# Patient Record
Sex: FEMALE | Race: BLACK OR AFRICAN AMERICAN | NOT HISPANIC OR LATINO | URBAN - METROPOLITAN AREA
[De-identification: names, ages, dates, MRNs, and addresses within clinical notes are randomized per-mention and may not be internally consistent; named-entity substitution may affect disease eponyms.]

---

## 2018-04-16 PROBLEM — 55570000 ASTHMA WITHOUT STATUS ASTHMATICUS: Status: ACTIVE | Noted: 2018-04-16

## 2018-04-16 PROBLEM — 59621000 ESSENTIAL HYPERTENSION: Status: ACTIVE | Noted: 2018-04-16

## 2018-04-16 PROBLEM — 429006005 FAMILY HISTORY OF MALIGNANT NEOPLASM OF GASTROINTESTINAL TRACT: Status: ACTIVE | Noted: 2018-04-16

## 2018-04-16 PROBLEM — 200938002 DISCOID LUPUS ERYTHEMATOSUS: Status: ACTIVE | Noted: 2018-04-16

## 2018-04-16 PROBLEM — 51615001 PULMONARY FIBROSIS: Status: ACTIVE | Noted: 2018-04-16

## 2018-04-16 PROBLEM — 54586004 LOWER ABDOMINAL PAIN: Status: ACTIVE | Noted: 2018-04-16

## 2018-05-30 PROBLEM — 238131007 OVERWEIGHT: Status: ACTIVE | Noted: 2018-05-30

## 2019-12-09 PROBLEM — 441988000 IMAGING OF ABDOMEN ABNORMAL: Status: ACTIVE | Noted: 2019-12-09

## 2021-06-24 ENCOUNTER — OFFICE VISIT (OUTPATIENT)
Dept: URBAN - METROPOLITAN AREA CLINIC 27 | Facility: CLINIC | Age: 66
End: 2021-06-24

## 2021-06-24 PROBLEM — 266435005 GASTRO-ESOPHAGEAL REFLUX DISEASE WITHOUT ESOPHAGITIS: Status: ACTIVE | Noted: 2021-06-24

## 2021-07-13 ENCOUNTER — OFFICE VISIT (OUTPATIENT)
Dept: URBAN - METROPOLITAN AREA SURGERY CENTER 7 | Facility: SURGERY CENTER | Age: 66
End: 2021-07-13

## 2021-07-28 ENCOUNTER — OFFICE VISIT (OUTPATIENT)
Dept: URBAN - METROPOLITAN AREA CLINIC 27 | Facility: CLINIC | Age: 66
End: 2021-07-28

## 2021-10-04 ENCOUNTER — OFFICE VISIT (OUTPATIENT)
Dept: URBAN - METROPOLITAN AREA MEDICAL CENTER 8 | Facility: MEDICAL CENTER | Age: 66
End: 2021-10-04

## 2021-10-20 ENCOUNTER — OFFICE VISIT (OUTPATIENT)
Dept: URBAN - METROPOLITAN AREA CLINIC 27 | Facility: CLINIC | Age: 66
End: 2021-10-20

## 2022-04-30 ENCOUNTER — TELEPHONE ENCOUNTER (OUTPATIENT)
Dept: URBAN - METROPOLITAN AREA CLINIC 121 | Facility: CLINIC | Age: 67
End: 2022-04-30

## 2022-05-01 ENCOUNTER — TELEPHONE ENCOUNTER (OUTPATIENT)
Dept: URBAN - METROPOLITAN AREA CLINIC 121 | Facility: CLINIC | Age: 67
End: 2022-05-01

## 2022-05-01 RX ORDER — ALBUTEROL SULFATE 2.5 MG/3ML
SOLUTION RESPIRATORY (INHALATION)
Status: ACTIVE | COMMUNITY
Start: 2018-04-16

## 2022-05-01 RX ORDER — DEXLANSOPRAZOLE 60 MG/1
CAPSULE, DELAYED RELEASE ORAL
Status: ACTIVE | COMMUNITY
Start: 2018-04-16

## 2022-05-01 RX ORDER — FAMOTIDINE 40 MG/1
TABLET, FILM COATED ORAL
Status: ACTIVE | COMMUNITY
Start: 2021-06-24

## 2022-05-01 RX ORDER — SODIUM SULFATE, POTASSIUM SULFATE, MAGNESIUM SULFATE 17.5; 3.13; 1.6 G/ML; G/ML; G/ML
MIX AND USE AS DIRECTED SOLUTION, CONCENTRATE ORAL
Status: ACTIVE | COMMUNITY
Start: 2021-06-25

## 2022-05-01 RX ORDER — MIRTAZAPINE 15 MG/1
TABLET, FILM COATED ORAL
Status: ACTIVE | COMMUNITY
Start: 2021-06-24

## 2022-05-01 RX ORDER — MULTIVITAMIN
TABLET ORAL
Status: ACTIVE | COMMUNITY
Start: 2018-04-16

## 2022-05-01 RX ORDER — BENZONATATE 100 MG/1
CAPSULE ORAL
Status: ACTIVE | COMMUNITY
Start: 2018-04-16

## 2022-05-01 RX ORDER — NINTEDANIB 150 MG/1
CAPSULE ORAL
Status: ACTIVE | COMMUNITY
Start: 2021-06-24

## 2022-05-01 RX ORDER — LOSARTAN POTASSIUM 100 MG/1
TABLET, FILM COATED ORAL
Status: ACTIVE | COMMUNITY
Start: 2021-06-24

## 2022-05-01 RX ORDER — PREDNISONE 1 MG/1
TABLET ORAL
Status: ACTIVE | COMMUNITY
Start: 2018-04-16

## 2022-05-01 RX ORDER — IRBESARTAN AND HYDROCHLOROTHIAZIDE 150; 12.5 MG/1; MG/1
TABLET, FILM COATED ORAL
Status: ACTIVE | COMMUNITY
Start: 2018-04-16

## 2022-05-01 RX ORDER — PANTOPRAZOLE SODIUM 40 MG/1
TABLET, DELAYED RELEASE ORAL
Status: ACTIVE | COMMUNITY
Start: 2021-06-24

## 2022-05-01 RX ORDER — NIFEDIPINE 30 MG/1
TABLET, EXTENDED RELEASE ORAL
Status: ACTIVE | COMMUNITY
Start: 2018-04-16

## 2022-05-01 RX ORDER — ETANERCEPT 50 MG/ML
SOLUTION SUBCUTANEOUS
Status: ACTIVE | COMMUNITY
Start: 2018-04-16

## 2022-05-01 RX ORDER — ZOLPIDEM TARTRATE 10 MG/1
TABLET, FILM COATED ORAL
Status: ACTIVE | COMMUNITY
Start: 2018-04-16

## 2022-05-01 RX ORDER — OXAPROZIN 600 MG
TABLET ORAL
Status: ACTIVE | COMMUNITY
Start: 2018-04-16

## 2022-05-01 RX ORDER — PHENOBARBITAL, HYOSCYAMINE SULFATE, ATROPINE SULFATE, SCOPOLAMINE HYDROBROMIDE 16.2; .1037; .0194; .0065 MG/1; MG/1; MG/1; MG/1
1 TABLET PO Q6H PRN FOR ABD PAIN TABLET ORAL
Status: ACTIVE | COMMUNITY
Start: 2019-12-18

## 2022-05-01 RX ORDER — MOMETASONE FUROATE AND FORMOTEROL FUMARATE DIHYDRATE 200; 5 UG/1; UG/1
AEROSOL RESPIRATORY (INHALATION)
Status: ACTIVE | COMMUNITY
Start: 2018-04-16

## 2023-03-02 ENCOUNTER — LAB OUTSIDE AN ENCOUNTER (OUTPATIENT)
Dept: URBAN - METROPOLITAN AREA CLINIC 27 | Facility: CLINIC | Age: 68
End: 2023-03-02

## 2023-03-02 ENCOUNTER — OFFICE VISIT (OUTPATIENT)
Dept: URBAN - METROPOLITAN AREA CLINIC 27 | Facility: CLINIC | Age: 68
End: 2023-03-02
Payer: MEDICARE

## 2023-03-02 ENCOUNTER — DASHBOARD ENCOUNTERS (OUTPATIENT)
Age: 68
End: 2023-03-02

## 2023-03-02 VITALS
SYSTOLIC BLOOD PRESSURE: 178 MMHG | RESPIRATION RATE: 17 BRPM | BODY MASS INDEX: 27 KG/M2 | HEIGHT: 67 IN | HEART RATE: 83 BPM | WEIGHT: 172 LBS | DIASTOLIC BLOOD PRESSURE: 101 MMHG

## 2023-03-02 DIAGNOSIS — K83.8 DILATED BILE DUCT: ICD-10-CM

## 2023-03-02 DIAGNOSIS — K21.9 GERD WITHOUT ESOPHAGITIS: ICD-10-CM

## 2023-03-02 PROBLEM — 266435005: Status: ACTIVE | Noted: 2023-03-02

## 2023-03-02 PROBLEM — 123608004: Status: ACTIVE | Noted: 2023-03-02

## 2023-03-02 PROCEDURE — 99214 OFFICE O/P EST MOD 30 MIN: CPT | Performed by: INTERNAL MEDICINE

## 2023-03-02 RX ORDER — NINTEDANIB 150 MG/1
CAPSULE ORAL
Status: ACTIVE | COMMUNITY
Start: 2021-06-24

## 2023-03-02 RX ORDER — PHENOBARBITAL, HYOSCYAMINE SULFATE, ATROPINE SULFATE, SCOPOLAMINE HYDROBROMIDE 16.2; .1037; .0194; .0065 MG/1; MG/1; MG/1; MG/1
1 TABLET PO Q6H PRN FOR ABD PAIN TABLET ORAL
Status: ACTIVE | COMMUNITY
Start: 2019-12-18

## 2023-03-02 RX ORDER — SODIUM SULFATE, POTASSIUM SULFATE, MAGNESIUM SULFATE 17.5; 3.13; 1.6 G/ML; G/ML; G/ML
MIX AND USE AS DIRECTED SOLUTION, CONCENTRATE ORAL
Status: ACTIVE | COMMUNITY
Start: 2021-06-25

## 2023-03-02 RX ORDER — PREDNISONE 1 MG/1
TABLET ORAL
Status: ACTIVE | COMMUNITY
Start: 2018-04-16

## 2023-03-02 RX ORDER — PANTOPRAZOLE SODIUM 40 MG/1
TABLET, DELAYED RELEASE ORAL
Status: ACTIVE | COMMUNITY
Start: 2021-06-24

## 2023-03-02 RX ORDER — MOMETASONE FUROATE AND FORMOTEROL FUMARATE DIHYDRATE 200; 5 UG/1; UG/1
AEROSOL RESPIRATORY (INHALATION)
Status: ACTIVE | COMMUNITY
Start: 2018-04-16

## 2023-03-02 RX ORDER — OXAPROZIN 600 MG
TABLET ORAL
Status: ACTIVE | COMMUNITY
Start: 2018-04-16

## 2023-03-02 RX ORDER — LOSARTAN POTASSIUM 100 MG/1
TABLET, FILM COATED ORAL
Status: ACTIVE | COMMUNITY
Start: 2021-06-24

## 2023-03-02 RX ORDER — ZOLPIDEM TARTRATE 10 MG/1
TABLET, FILM COATED ORAL
Status: ACTIVE | COMMUNITY
Start: 2018-04-16

## 2023-03-02 RX ORDER — NIFEDIPINE 30 MG/1
TABLET, EXTENDED RELEASE ORAL
Status: ACTIVE | COMMUNITY
Start: 2018-04-16

## 2023-03-02 RX ORDER — MULTIVITAMIN
TABLET ORAL
Status: ACTIVE | COMMUNITY
Start: 2018-04-16

## 2023-03-02 RX ORDER — IRBESARTAN AND HYDROCHLOROTHIAZIDE 150; 12.5 MG/1; MG/1
TABLET, FILM COATED ORAL
Status: ACTIVE | COMMUNITY
Start: 2018-04-16

## 2023-03-02 RX ORDER — DEXLANSOPRAZOLE 60 MG/1
CAPSULE, DELAYED RELEASE ORAL
Status: ACTIVE | COMMUNITY
Start: 2018-04-16

## 2023-03-02 RX ORDER — FAMOTIDINE 40 MG/1
TABLET, FILM COATED ORAL
Status: ACTIVE | COMMUNITY
Start: 2021-06-24

## 2023-03-02 RX ORDER — BENZONATATE 100 MG/1
CAPSULE ORAL
Status: ACTIVE | COMMUNITY
Start: 2018-04-16

## 2023-03-02 RX ORDER — MIRTAZAPINE 15 MG/1
TABLET, FILM COATED ORAL
Status: ACTIVE | COMMUNITY
Start: 2021-06-24

## 2023-03-02 RX ORDER — ETANERCEPT 50 MG/ML
SOLUTION SUBCUTANEOUS
Status: ACTIVE | COMMUNITY
Start: 2018-04-16

## 2023-03-02 RX ORDER — ALBUTEROL SULFATE 2.5 MG/3ML
SOLUTION RESPIRATORY (INHALATION)
Status: ACTIVE | COMMUNITY
Start: 2018-04-16

## 2023-03-02 NOTE — HPI-TODAY'S VISIT:
67-year-old female here for follow-up of GERD and abnormal imaging.  She has never had much in the way of heartburn symptoms, her predominant issues with GERD have included respiratory symptoms.  She has a history of pulmonary fibrosis, respiratory symptoms were worse with out-of-control reflux disease.  She is now on pantoprazole and doing better.  She did have slight worsening of her PFTs at her last pulmonary visit. She had an MRI of her spine and incidentally was noted to have a dilated common bile duct to 10 mm.  She still has her gallbladder.  Denies abdominal pain.  No jaundice.  She had liver enzymes last week with her rheumatologist that were normal. 67-year-old female here for follow-up of GERD and abnormal imaging.  She has never had much in the way of heartburn symptoms, her predominant issues with GERD have included respiratory symptoms.  She has a history of pulmonary fibrosis, respiratory symptoms were worse with out-of-control reflux disease.  She is now on pantoprazole and doing better.  She did have slight worsening of her PFTs at her last pulmonary visit. She had an MRI of her spine and incidentally was noted to have a dilated common bile duct to 10 mm.  She still has her gallbladder.  Denies abdominal pain.  No jaundice.  She had liver enzymes last week with her rheumatologist that were normal.

## 2023-03-02 NOTE — PHYSICAL EXAM GASTROINTESTINAL
Abdomen , soft, nontender, nondistended , no guarding or rigidity , no masses palpable , normal bowel sounds , Liver and Spleen , no hepatomegaly present , no hepatosplenomegaly , liver nontender , spleen not palpable
[Negative] : Psychiatric